# Patient Record
(demographics unavailable — no encounter records)

---

## 2024-10-07 NOTE — HISTORY OF PRESENT ILLNESS
[de-identified] : mom reports pt w rash on face and extremities x 2 days, HFM exposure at , + UO, appetite OK, mom denies fever, NVD, SOB, fatigue [FreeTextEntry6] : temp to 100.1 coughing, congested appetite seems OK

## 2024-10-07 NOTE — PHYSICAL EXAM
[TextEntry] : General:  no acute distress, alert   Ears:  clear tympanic membranes bilaterally  Nose:  pink nasal mucosa  Mouth:  erythematous oropharynx with blisters Neck:  supple   Lungs:  clear to auscultation bilaterally  Cardiac:  regular rate and rhythm  Abdomen:  soft, non tender, non distended  Lymphatics:  no abnormal lymph nodes palpated Skin:  warm, scattered erythematous macules/papules/blisters on chin, torso, arms/hands, legs/feet

## 2024-11-15 NOTE — DISCUSSION/SUMMARY
[] : The components of the vaccine(s) to be administered today are listed in the plan of care. The disease(s) for which the vaccine(s) are intended to prevent and the risks have been discussed with the caretaker.  The risks are also included in the appropriate vaccination information statements which have been provided to the patient's caregiver.  The caregiver has given consent to vaccinate. [FreeTextEntry1] : Recommend breastfeeding, 8-12 feedings per day. If formula is needed, 2-4 oz every 3-4 hrs. Introduce single-ingredient foods rich in iron, one at a time. Incorporate up to 4 oz of flourinated water daily in a sippy cup. When teeth erupt wipe daily with washcloth. When in car, patient should be in rear-facing car seat in back seat. Put baby to sleep on back, in own crib with no loose or soft bedding. Lower crib matress. Help baby to maintain sleep and feeding routines. May offer pacifier if needed. Continue tummy time when awake. Ensure home is safe since baby is now more mobile. Do not use infant walker. Read aloud to baby.  D/W caregiver viral URI- RSV negative- recommend supportive care including antipyretics, fluids, and nasal saline followed by nasal suction. Return if symptoms worsen or persist. Parent declined vaccines today due to acute illness- will return in 2weeks for 6month vaccines; parent declined flu vaccine and beyfortus as well.  time spent: 20min

## 2024-11-15 NOTE — HISTORY OF PRESENT ILLNESS
[Well-balanced] : well-balanced [Breast milk] : breast milk [Fruits] : fruits [Vegetables] : vegetables [Normal] : Normal [No] : No cigarette smoke exposure [Water heater temperature set at <120 degrees F] : Water heater temperature set at <120 degrees F [Rear facing car seat in back seat] : Rear facing car seat in back seat [Carbon Monoxide Detectors] : Carbon monoxide detectors at home [Smoke Detectors] : Smoke detectors at home. [Vitamins ___] : Patient takes [unfilled] vitamins daily [In Bassinet/Crib] : sleeps in bassinet/crib [On back] : sleeps on back [Tummy time] : tummy time [Loose bedding, pillow, toys, and/or bumpers in crib] : no loose bedding, pillow, toys, and/or bumpers in crib [de-identified] : 6 Month WCC. Mom states pt is congested for 2 days. No fever Mom mentioned pt was exposed to RSV at  [de-identified] : nursing Q2-3hrs [FreeTextEntry1] : + congestion + cough, no ST, no ear pain, no n/v/c/d, eating and drinking well, normal voiding. afebrile. RSV at

## 2024-11-15 NOTE — PHYSICAL EXAM
[Alert] : alert [Normocephalic] : normocephalic [Flat Open Anterior [place]] : flat open anterior fontanelle [Red Reflex] : red reflex bilateral [PERRL] : PERRL [Normally Placed Ears] : normally placed ears [Auricles Well Formed] : auricles well formed [Clear Tympanic membranes] : clear tympanic membranes [Light reflex present] : light reflex present [Bony landmarks visible] : bony landmarks visible [Nares Patent] : nares patent [Palate Intact] : palate intact [Uvula Midline] : uvula midline [Supple, full passive range of motion] : supple, full passive range of motion [Symmetric Chest Rise] : symmetric chest rise [Clear to Auscultation Bilaterally] : clear to auscultation bilaterally [Regular Rate and Rhythm] : regular rate and rhythm [S1, S2 present] : S1, S2 present [+2 Femoral Pulses] : (+) 2 femoral pulses [Soft] : soft [Bowel Sounds] : bowel sounds present [Normal External Genitalia] : normal external genitalia [Normal Vaginal Introitus] : normal vaginal introitus [Patent] : patent [Normally Placed] : normally placed [No Abnormal Lymph Nodes Palpated] : no abnormal lymph nodes palpated [Symmetric Buttocks Creases] : symmetric buttocks creases [Plantar Grasp] : plantar grasp reflex present [Cranial Nerves Grossly Intact] : cranial nerves grossly intact [Acute Distress] : no acute distress [Discharge] : no discharge [Tooth Eruption] : no tooth eruption [Palpable Masses] : no palpable masses [Murmurs] : no murmurs [Tender] : nontender [Distended] : nondistended [Hepatomegaly] : no hepatomegaly [Splenomegaly] : no splenomegaly [Clitoromegaly] : no clitoromegaly [Mandujano-Ortolani] : negative Mandujano-Ortolani [Allis Sign] : negative Allis sign [Spinal Dimple] : no spinal dimple [Tuft of Hair] : no tuft of hair [Rash or Lesions] : no rash/lesions

## 2024-12-09 NOTE — PHYSICAL EXAM
[TextEntry] : General: awake, alert, appropriate, no acute distress Head: no signs injury Eyes: EOMI, PERRL, no discharge, no conjunctival or scleral erythema  Ears: tympanic membranes clear on the left and + bulging with purulent effusion on the right  Nose: +rhinorrhea, inflamed nasal turbinates bilaterally Mouth: mucosa moist and pink, no erythema to the oropharynx, no vesicles, exudates, lesions or soft palate petechiae Neck: supple, good range of motion Lungs: rhonchi scattered B/L without wheezing, no accessory muscle use, breathing comfortably on room air Cardiac: normal S1 S2, regular rate and rhythm Abdomen: soft, non tender, non distended Lymphatics: no cervical lymphadenopathy, no pre or post auricular lymphadenopathy, no occipital lymphadenopathy Skin: no rash

## 2024-12-09 NOTE — PLAN
[TextEntry] : Recommend trailing a cool mist humidifier. Respiratory distress discussed at length and any concerns to seek medical attention. natural course of rsv discussed peaks days 5-7, nasal secretions may become thick and difficult for baby to deal with so suction and monitoring closely are necessary  Fever can be treated with an over-the-counter medication such as acetaminophen or ibuprofen if > 6mo. Encourage PO intake fluids. Cool or cold air or warm steamy shower room can help symptoms. follow up closely and if worsening ER or here if we are open Symptomatic treatment of fever and/or pain discussed Start medication as instructed Hydrate well Handwashing and infection control discussed Return to office if febrile > 48 hours or if symptoms get worse Go to ER if unable to come to the office or during after hours, parent encouraged to call service first before doing so. Follow up 2 weeks

## 2024-12-09 NOTE — HISTORY OF PRESENT ILLNESS
[de-identified] : f/u urgent care went for cough dx with bronchiolits as per mom given nebs with saline developed temp over weekend still has cough  [FreeTextEntry6] : started feeling sick with cough 3 days ago, progressively got worse 2 days ago fever tmax 101.4 yesterday no more fever  not vomtiing or diarrhea eating a bit less than normal but mostly normal  they are doing nasal saline and suction and doing warm steamy shower room they are doing neb with saline mom feels like it helps a bit

## 2025-01-31 NOTE — DISCUSSION/SUMMARY
[FreeTextEntry1] : Supportive measures for upper respiratory infection were discussed. Such measures include use of nasal saline and suction as needed to clear the nasal passages, increasing fluids, hot showers or steam from the bathroom, propping the child up on a second pillow (for children > 1year old), use of an OTC home remedy such as vapo rub for comfort and giving 1 tablespoon of honey an hour before bedtime for cough.  Ibuprofen or acetaminophen can be used for fever or pain as per 's instructions. Return for new or worsening symptoms.

## 2025-01-31 NOTE — HISTORY OF PRESENT ILLNESS
[de-identified] : mom reports pt w cough and congestion x 4 days, pt seen at [facility] yesterday dx w OM but told to f/u w pediatrician for congestion, +UO, appetite OK, mom denies fever, NVD, SOB, wheezing  [FreeTextEntry6] : 4 days congestion, cough, seen at urgent care yesterday dx Right otitis media. A little more clingy than usual but afebrile. Has eye discharge but no redness.

## 2025-01-31 NOTE — HISTORY OF PRESENT ILLNESS
[de-identified] : mom reports pt w cough and congestion x 4 days, [place] [facility] [place] w OM but told to f/u w pediatrician for congestion, +UO, appetite OK, mom denies fever, NVD, SOB, wheezing  [FreeTextEntry6] : 4 days congestion, cough, seen at urgent care yesterday dx Right otitis media. A little more clingy than usual but afebrile. Has eye discharge but no redness.

## 2025-01-31 NOTE — PHYSICAL EXAM
[Congestion] : congestion [Transmitted Upper Airway Sounds] : transmitted upper airway sounds [NL] : warm, clear [FreeTextEntry3] : right TM dull

## 2025-02-01 NOTE — PHYSICAL EXAM
[Alert] : alert [Acute Distress] : no acute distress [Normocephalic] : normocephalic [Flat Open Anterior [place]] : flat open anterior fontanelle [Red Reflex] : red reflex bilateral [Excessive Tearing] : no excessive tearing [PERRL] : PERRL [Normally Placed Ears] : normally placed ears [Auricles Well Formed] : auricles well formed [Light reflex present] : light reflex present [Bony landmarks visible] : bony landmarks visible [Discharge] : discharge [Nares Patent] : nares patent [Palate Intact] : palate intact [Uvula Midline] : uvula midline [Supple, full passive range of motion] : supple, full passive range of motion [Palpable Masses] : no palpable masses [Clear to Auscultation Bilaterally] : clear to auscultation bilaterally [Symmetric Chest Rise] : symmetric chest rise [Regular Rate and Rhythm] : regular rate and rhythm [S1, S2 present] : S1, S2 present [Murmurs] : no murmurs [+2 Femoral Pulses] : (+) 2 femoral pulses [Soft] : soft [Tender] : nontender [Distended] : nondistended [Bowel Sounds] : bowel sounds present [Hepatomegaly] : no hepatomegaly [Splenomegaly] : no splenomegaly [Normal External Genitalia] : normal external genitalia [Clitoromegaly] : no clitoromegaly [Normal Vaginal Introitus] : normal vaginal introitus [No Abnormal Lymph Nodes Palpated] : no abnormal lymph nodes palpated [Symmetric abduction and rotation of hips] : symmetric abduction and rotation of hips [Allis Sign] : negative Allis sign [Straight] : straight [Cranial Nerves Grossly Intact] : cranial nerves grossly intact [Rash or Lesions] : no rash/lesions [de-identified] : right dull and fluid- left clear [de-identified] : upper airway sounds  [de-identified] : red raw skin from stool contact

## 2025-02-01 NOTE — HISTORY OF PRESENT ILLNESS
[Mother] : mother [Breast milk] : breast milk [On back] : sleeps on back [No] : No cigarette smoke exposure [FreeTextEntry7] : 9 mo c [de-identified] : feeding well -purees mostly- does not seem to like texture yet - has not tried PB eggs or shellfish yet  [de-identified] : loose stool from meds [de-identified] : quick comfort nursing sessions at pm  [FreeTextEntry1] : has been sick often (in ) mom concerned that always seems to be in her chest  seen at [facility] - dx with ROM- cough sounds chesty but exam is normal, seen her for f/u chest clear

## 2025-02-01 NOTE — DISCUSSION/SUMMARY
[Normal Growth] : growth [Normal Development] : development [No Elimination Concerns] : elimination [No Feeding Concerns] : feeding [Normal Sleep Pattern] : sleep [No Medications] : ~He/She~ is not on any medications [Mother] : mother [de-identified] : discussed eats well during day- should not need feeds at pm  [de-identified] : apply mupirocin and thick barrier ointment  [FreeTextEntry9] : guidance handout given to parent [FreeTextEntry1] : Continue breastmilk or formula as desired. Increase table foods, 3 meals with 2-3 snacks per day. Incorporate up to 6 oz of  water daily in a sippy cup. At 11.5 months , start to introduce whole milk by adding small amounts to formula and slowly  increase amount every few days Wipe teeth daily with washcloth. When in car, patient should be in rear-facing car seat in back seat. Put baby to sleep in own crib with no loose or soft bedding. Lower crib matress. Help baby to maintain consistent daily routines and sleep schedule. Recognize stranger anxiety. Ensure home is safe since baby is increasingly mobile. Be within arm's reach of baby at all times. Use consistent, positive discipline. Avoid screen time. Read aloud to baby.  HOLD VACCINES DUE TO ILLNESS- recheck when meds completed and administer vaccines then

## 2025-02-14 NOTE — DISCUSSION/SUMMARY
[FreeTextEntry1] : OM resolved. Discussed gradual introduction of new textures. Allow self-feeding. Will reassess at 12 m

## 2025-02-14 NOTE — HISTORY OF PRESENT ILLNESS
[de-identified] : Ear recheck, cough still present as per mom. No other c/o.  [FreeTextEntry6] : Here for ear recheck, doing well. Still has cough, but is afebrile.  Mom wants to talk about feeding- having trouble with textures. Will gag and throw up. Does great with purees.

## 2025-03-21 NOTE — HISTORY OF PRESENT ILLNESS
[de-identified] : cough congestion  [FreeTextEntry6] : cough congestion no fever history of ? wheeze x1 at PM Peds, has albutero/neb but not used this illness Normal PO, UOP, BMs No vomiting No rash Was exposed to croup this weekend and is in

## 2025-03-21 NOTE — PHYSICAL EXAM
[Wheezing] : no wheezing [Tachypnea] : no tachypnea [Belly Breathing] : no belly breathing [NL] : warm, clear [FreeTextEntry4] : dried mucous  [FreeTextEntry7] : Transmitted upper airway sounds

## 2025-03-21 NOTE — HISTORY OF PRESENT ILLNESS
[de-identified] : cough congestion  [FreeTextEntry6] : cough congestion no fever history of ? wheeze x1 at PM Peds, has albutero/neb but not used this illness Normal PO, UOP, BMs No vomiting No rash Was exposed to croup this weekend and is in

## 2025-03-21 NOTE — DISCUSSION/SUMMARY
[FreeTextEntry1] : - Saline nebs, suction nose, humidifier - Discussed with pt / family to observe for signs and symptoms of respiratory distress including the following:  shortness of breath, increased respiratory rate, wheezing, difficulty breathing, sternal notch/intercostal retractions, and/or accessory muscle use during respiration. Pt / family to call our office to update patient's status if above changes are noted or worsen.

## 2025-05-18 NOTE — HISTORY OF PRESENT ILLNESS
[Parents] : parents [FreeTextEntry7] : 12 mo [facility] [FreeTextEntry1] : LESLIE  is here for 12 month  well child visit   Nutrition:whole milk and breast milk about 2 bottles at  about 14 oz plus bf at home including night time feeding issues improved from last visit eats solids, table food small amounts, discussed offer solids first as prefers to drink milk Elimination: Normal urination and bowel movements Sleep: No concerns Immunizations: Up to date. Environmental   safety discussed   Patient is doing well at home.  in  Parent(s) have current concerns or issues.doing well feeding issues improving  dad specific history at 6 mo [facility] illness and 9 mo had right ear infection needs vaccines

## 2025-05-18 NOTE — HISTORY OF PRESENT ILLNESS
[Parents] : parents [FreeTextEntry7] : 12 mo [facility] [FreeTextEntry1] : LESLIE  is here for 12 month  well child visit   Nutrition:whole milk and breast milk about 2 bottles at  about 14 oz plus bf at home including night time feeding issues improved from last visit eats solids, table food small amounts, discussed offer solids first as prefers to drink milk Elimination: Normal urination and bowel movements Sleep: No concerns Immunizations: Up to date. Environmental   safety discussed   Patient is doing well at home.  in  Parent(s) have current concerns or issues.doing well feeding issues improving  dad specific history at 6 mo [facility] illness and 9 mo had right ear infection needs vaccines

## 2025-05-18 NOTE — DEVELOPMENTAL MILESTONES
[FreeTextEntry1] : [place]:  Gross Motor 14-3      Fine Motor 16-1     Psychosocial 19-3     Language 13-1

## 2025-05-18 NOTE — PHYSICAL EXAM
[TextEntry] : General Appearance:  Awake,  Alert  In no acute distress good eye contact well appearing Neck:  supple. Eyes:   Pupils:  PERRL Ears: bilateral  Tympanic Membrane:  Pearly with light reflex bilaterally. Pharynx:Normal findings  non erythematous pharynx Lungs:  Clear to auscultation. Cardiovascular: Heart Rate And Rhythm:  Regular. Heart Sounds:  Normal. Murmurs:  No murmurs were heard. Abdomen: Palpation:  Soft.  Liver:  Not enlarged. Spleen:  Not enlarged.  Genitalia: Ryan 1  normal female external genitalia   Musculoskeletal System: General/bilateral:  Normal movement of all extremities.   Normal spine and back. Hips: General/bilateral:  An Ortolani test of the hips was negative.   Mandujano's test of the hips was negative Neurological:Motor:  Normal muscle tone.

## 2025-05-18 NOTE — PLAN
[TextEntry] : follow up weight in 6 weeks and MMR parent to schedule appt behind on vaccines due to illness, discussed with parents schedule re 6 mo/12mo parents would like vaxelis, prevnar today, discussed re MMR discussed rotavirus needs to be less than 32 weeks to receive, Lauren unable to receive 3rd vaccine parents asking if can come later for vaccines, advised to return for weight check and MMR in 6 weeks and hold hepatitis A until later and then will need 15 mo vaccines to do hepatitis A in future offer solids/table food first prior to milk, breast feeding at night. weight decrease from 71 to 61%, feeding difficulties improving The following 12 month anticipatory guidance topics were discussed and/or handouts given: establishing routines, feeding and appetite changes, oral hygiene and safety. Counseling for nutrition was provided.   Information discussed with parent/guardian.    The components of the vaccine(s) to be administered today are listed in the plan of care. The disease(s) for which the vaccine(s) are intended to prevent and the risks have been discussed with the caretaker. The risks are also included in the appropriate vaccination information statements which have been provided to the patient's caregiver. The caregiver has given consent to vaccinate.

## 2025-07-04 NOTE — HISTORY OF PRESENT ILLNESS
[de-identified] : As per Parent, Pt c/o COUGH, FEVER AND BUMPS ON SKIN since 07/01/25. Last dose of TYLENOL given at 5:45AM  [FreeTextEntry6] : LESLIE  is here today for a history of fever, uri, mucus in bowel movement diaper rash fever 102.3 started yesterday coughing congested mucus in bm no blood mom showed me picture loose stools last week, diaper rash, had pb last week no hives decreased appetite hist week fluids ok no wheeze no croupy cough cousings URI out  for 3 weeks

## 2025-07-04 NOTE — PHYSICAL EXAM
[TextEntry] : Gen: Awake, alert,  In no acute distress , well appearing active vigorous Eyes: no periorbital swelling Ears : right  External Auditory Canal:  Normal. Tympanic Membrane:  Normal             left External Auditory Canal:  Normal   Tympanic Membrane:  Normal mid cerumen Pharynx: redness 2 plus,no sores no white patches on buccal mucosa Neck supple Lymph: anterior and submandibular glands no lymphadenopathy Cardiac : normal rate, regular rhythm, S1,S2 normal, no murmur Lungs: clear to auscultation, no crackles no wheeze, no grunting flaring or retractions Abdomen: soft, not tender, not distended satellite lesions diaper region soles of feet no rash  moist mucus membranes

## 2025-07-04 NOTE — HISTORY OF PRESENT ILLNESS
[de-identified] : As per Parent, Pt c/o COUGH, FEVER AND BUMPS ON SKIN since 07/01/25. Last dose of TYLENOL given at 5:45AM  [FreeTextEntry6] : LESLIE  is here today for a history of fever, uri, mucus in bowel movement diaper rash fever 102.3 started yesterday coughing congested mucus in bm no blood mom showed me picture loose stools last week, diaper rash, had pb last week no hives decreased appetite hist week fluids ok no wheeze no croupy cough cousings URI out  for 3 weeks

## 2025-07-04 NOTE — DISCUSSION/SUMMARY
[FreeTextEntry1] : rapid strep done if throat culture positive give amoxicillin (400mg/5ml) give 4ml po bid for 10 days nystatin diaper rash for 10 days Patient or caretaker was instructed in use of antipyretics.  Also, the patient is to return if there is persistence of fever for more than  72 hours, pain or other new significant symptoms. declines flu/covid 19 test supportive care